# Patient Record
Sex: MALE | Race: ASIAN | NOT HISPANIC OR LATINO | URBAN - METROPOLITAN AREA
[De-identification: names, ages, dates, MRNs, and addresses within clinical notes are randomized per-mention and may not be internally consistent; named-entity substitution may affect disease eponyms.]

---

## 2020-07-27 ENCOUNTER — LAB OUTSIDE AN ENCOUNTER (OUTPATIENT)
Dept: URBAN - METROPOLITAN AREA CLINIC 105 | Facility: CLINIC | Age: 45
End: 2020-07-27

## 2020-07-29 LAB
A/G RATIO: 2
ALBUMIN: 4.9
ALKALINE PHOSPHATASE: 68
ALT (SGPT): 29
AST (SGOT): 21
BASO (ABSOLUTE): 0
BASOS: 0
BILIRUBIN, TOTAL: 0.5
BUN/CREATININE RATIO: 10
BUN: 9
CALCIUM: 9.8
CARBON DIOXIDE, TOTAL: 27
CHLORIDE: 99
CREATININE: 0.9
EGFR IF AFRICN AM: 120
EGFR IF NONAFRICN AM: 104
EOS (ABSOLUTE): 0.1
EOS: 2
GLOBULIN, TOTAL: 2.5
GLUCOSE: 97
HEMATOCRIT: 46
HEMATOLOGY COMMENTS:: (no result)
HEMOGLOBIN: 15.7
IMMATURE CELLS: (no result)
IMMATURE GRANS (ABS): 0
IMMATURE GRANULOCYTES: 0
IMMUNOGLOBULIN A, QN, SERUM: 178
LYMPHS (ABSOLUTE): 1.9
LYMPHS: 23
MCH: 31.4
MCHC: 34.1
MCV: 92
MONOCYTES(ABSOLUTE): 0.6
MONOCYTES: 8
NEUTROPHILS (ABSOLUTE): 5.3
NEUTROPHILS: 67
NRBC: (no result)
PLATELETS: 240
POTASSIUM: 4
PROTEIN, TOTAL: 7.4
RBC: 5
RDW: 12.6
SODIUM: 140
T-TRANSGLUTAMINASE (TTG) IGA: <2
T4,FREE(DIRECT): 1.71
TSH: 1.34
WBC: 8

## 2020-07-30 ENCOUNTER — LAB OUTSIDE AN ENCOUNTER (OUTPATIENT)
Dept: URBAN - METROPOLITAN AREA CLINIC 105 | Facility: CLINIC | Age: 45
End: 2020-07-30

## 2020-07-30 LAB — GASTROINTESTINAL PATHOGEN: (no result)

## 2020-07-31 ENCOUNTER — WEB ENCOUNTER (OUTPATIENT)
Dept: URBAN - METROPOLITAN AREA CLINIC 105 | Facility: CLINIC | Age: 45
End: 2020-07-31

## 2020-08-04 ENCOUNTER — TELEPHONE ENCOUNTER (OUTPATIENT)
Dept: URBAN - METROPOLITAN AREA CLINIC 105 | Facility: CLINIC | Age: 45
End: 2020-08-04

## 2020-08-05 LAB
Lab: (no result)
OVA + PARASITE EXAM: (no result)

## 2020-08-06 ENCOUNTER — TELEPHONE ENCOUNTER (OUTPATIENT)
Dept: URBAN - METROPOLITAN AREA CLINIC 105 | Facility: CLINIC | Age: 45
End: 2020-08-06

## 2020-08-06 ENCOUNTER — WEB ENCOUNTER (OUTPATIENT)
Dept: URBAN - METROPOLITAN AREA CLINIC 105 | Facility: CLINIC | Age: 45
End: 2020-08-06

## 2020-08-10 ENCOUNTER — WEB ENCOUNTER (OUTPATIENT)
Dept: URBAN - METROPOLITAN AREA CLINIC 105 | Facility: CLINIC | Age: 45
End: 2020-08-10

## 2020-08-13 ENCOUNTER — WEB ENCOUNTER (OUTPATIENT)
Dept: URBAN - METROPOLITAN AREA CLINIC 105 | Facility: CLINIC | Age: 45
End: 2020-08-13

## 2020-08-25 ENCOUNTER — WEB ENCOUNTER (OUTPATIENT)
Dept: URBAN - METROPOLITAN AREA CLINIC 105 | Facility: CLINIC | Age: 45
End: 2020-08-25

## 2020-08-27 ENCOUNTER — LAB OUTSIDE AN ENCOUNTER (OUTPATIENT)
Dept: URBAN - METROPOLITAN AREA CLINIC 105 | Facility: CLINIC | Age: 45
End: 2020-08-27

## 2020-08-27 ENCOUNTER — OFFICE VISIT (OUTPATIENT)
Dept: URBAN - METROPOLITAN AREA CLINIC 105 | Facility: CLINIC | Age: 45
End: 2020-08-27
Payer: COMMERCIAL

## 2020-08-27 DIAGNOSIS — N50.819 TESTICULAR PAIN: ICD-10-CM

## 2020-08-27 DIAGNOSIS — B37.0 ORAL THRUSH: ICD-10-CM

## 2020-08-27 DIAGNOSIS — R10.32 LEFT INGUINAL PAIN: ICD-10-CM

## 2020-08-27 DIAGNOSIS — R19.7 DIARRHEA: ICD-10-CM

## 2020-08-27 DIAGNOSIS — F41.8 ANXIETY ABOUT HEALTH: ICD-10-CM

## 2020-08-27 PROCEDURE — G9903 PT SCRN TBCO ID AS NON USER: HCPCS | Performed by: INTERNAL MEDICINE

## 2020-08-27 PROCEDURE — 99214 OFFICE O/P EST MOD 30 MIN: CPT | Performed by: INTERNAL MEDICINE

## 2020-08-27 PROCEDURE — G8420 CALC BMI NORM PARAMETERS: HCPCS | Performed by: INTERNAL MEDICINE

## 2020-08-27 PROCEDURE — G8427 DOCREV CUR MEDS BY ELIG CLIN: HCPCS | Performed by: INTERNAL MEDICINE

## 2020-08-27 RX ORDER — ALBUTEROL SULFATE 90 UG/1
INHALE 1 PUFF (90 MCG) BY INHALATION ROUTE EVERY 6 HOURS AS NEEDED AEROSOL, METERED RESPIRATORY (INHALATION)
Qty: 1 | Refills: 0 | Status: ACTIVE | COMMUNITY
Start: 1900-01-01

## 2020-08-27 RX ORDER — LEVOTHYROXINE SODIUM 25 UG/1
TAKE 1 TABLET (25 MCG) BY ORAL ROUTE ONCE DAILY TABLET ORAL 1
Qty: 0 | Refills: 0 | Status: ACTIVE | COMMUNITY
Start: 1900-01-01

## 2020-08-27 RX ORDER — VALACYCLOVIR HCL 500 MG
TAKE 1 TABLET BY ORAL ROUTE AS NEEDED TABLET ORAL
Qty: 0 | Refills: 0 | Status: DISCONTINUED | COMMUNITY
Start: 1900-01-01

## 2020-08-27 NOTE — HPI-OTHER HISTORIES
The patient presents in follow-up for diarrhea.  He was initially see on 12/17/20 for intermittent LLQ pain that started 3 months ago. It's a sharp pain that occurs 1-3x/QOD and lasts for a few min. The pain worsens when he sits, sleeps on his left side, and does yoga. He notes improvement with palpation. He also reports testicular pain that began at the same time. He can report pain in both areas simultaneously, but it's possible for him to have LLQ pain without testicular discomfort. This started around the time he was diagnosed with thrush that did not respond to Diflucan so prescribed itraconazole (Symbicort thought to be cause).   He occasionally feels a LLQ bulge. He takes Tylenol 1 pill PRN. He takes 1 in day. It relieved his discomfort. He last followed-up with Dr. Enriquez in Sep for his pain. He takes TUMs PRN for heartburn. He notes relief. He previously took Prevacid 6-7 years ago for about 2 years. He reports mucus in his stool.  On 5/28/20, he stated he was having episodic diarrhea since Sept. 2019.  He noted a recent exacerbation starting 2 weeks ago.  He described 1 BM/day which was still a "log of stool", but with some accompanying "froth."  He had bloating in the AM.  He stated he also had a penile lesion (noted previous herpes diagnosis) and he noted association with penile lesion and the change in his bowel habit. At this point he had very little LLQ pain which if he had was relieved with Tylenol. 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 He was referred to urology and tx'ed with abx for 2 weeks.    Today, he says he finished the 10 day course of Cipro given stool study 7/27/20 noted EPEC. He has diarrhea 2-3x/week with urgency (soft - noted in photograph patient provided - not watery diarrhea). He has formed BMs between episodes. He takes a probiotic QD. It provides more formation he states. He finished course of Cipro 10 days. He has been on fluconazole for 3 days/month for the past year for recurrent thrush. It was prescribed by his PCP (Dr. Enriquez) he states. He says onset of diarrhea came after starting fluconazole. He says chickpeas, watermelon, and apple cause an immediate BM. He notes fatigue, weakness, and the inability to concentrate at work. He denies anxiety. He admits worry when diagnosed with mouth thrush.  Last Symbicort use was 8 months before. He takes Levothyroxine. Hernia repair (left inguinal) done on 7/10/20.    ENT note 11/18/19 by Dr. Mancuso notes pt referred for mouth ulcers.  Previously oral thrush had resolved.  Biotene for dry mouth recommended.  PCP clinic note 6/30/20 by Dr. Enriquez notes history of viral/fungal skin infectious of genitalia.  He had trialed antifungal ointment/Valtrex with limited improvement.  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  He was prescribed famcyclovir and was to use an old Bactrim script.  That was unsuccessfuly and he was prescribed doxycycline or cipro.  Noted to have tonsil stones improved with peridex.  PCP clinic note 8/7/20 by Dr. Enriquez notes patient worried he has a GI/oral fungal infection because of recent Cipro use.  Dx;ed with IBS w/ diarrhea exacerbated by E coli infection.  Noted to have anxiety and SSRI recommended.  Patient noted to have anxiety related to medical health.  Labs 7/28/20 - Stool GPP - EPEC positive. 11/8/19 - CMP normal except glucose 108, ALT 56.  CBC normal.  TSH 2.86, FT4 1.4.  HIV non-reactive. 10/1/19 - CMP normal.  CBC normal.  HgbA1C 5.2.   8/20/19 - UA negative.

## 2020-08-29 LAB
A/G RATIO: 1.6
ALBUMIN: 4.6
ALKALINE PHOSPHATASE: 82
ALT (SGPT): 21
AST (SGOT): 19
BASO (ABSOLUTE): 0
BASOS: 0
BILIRUBIN, TOTAL: 0.8
BUN/CREATININE RATIO: 8
BUN: 7
CALCIUM: 10.1
CARBON DIOXIDE, TOTAL: 27
CHLORIDE: 97
CREATININE: 0.93
EGFR IF AFRICN AM: 115
EGFR IF NONAFRICN AM: 99
EOS (ABSOLUTE): 0.1
EOS: 1
GLOBULIN, TOTAL: 2.8
GLUCOSE: 121
HEMATOCRIT: 48.4
HEMATOLOGY COMMENTS:: (no result)
HEMOGLOBIN: 16.3
IMMATURE CELLS: (no result)
IMMATURE GRANS (ABS): 0
IMMATURE GRANULOCYTES: 0
IMMUNOGLOBULIN A, QN, SERUM: 178
LYMPHS (ABSOLUTE): 1.5
LYMPHS: 22
MCH: 30.6
MCHC: 33.7
MCV: 91
MONOCYTES(ABSOLUTE): 0.5
MONOCYTES: 6
NEUTROPHILS (ABSOLUTE): 5
NEUTROPHILS: 71
NRBC: (no result)
PLATELETS: 215
POTASSIUM: 4.1
PROTEIN, TOTAL: 7.4
RBC: 5.33
RDW: 13.3
SODIUM: 139
T-TRANSGLUTAMINASE (TTG) IGA: <2
T4,FREE(DIRECT): 1.82
TSH: 1.2
WBC: 7

## 2020-08-31 ENCOUNTER — WEB ENCOUNTER (OUTPATIENT)
Dept: URBAN - METROPOLITAN AREA CLINIC 105 | Facility: CLINIC | Age: 45
End: 2020-08-31

## 2020-09-02 LAB — GASTROINTESTINAL PATHOGEN: (no result)

## 2020-09-21 ENCOUNTER — OFFICE VISIT (OUTPATIENT)
Dept: URBAN - METROPOLITAN AREA SURGERY CENTER 16 | Facility: SURGERY CENTER | Age: 45
End: 2020-09-21

## 2020-11-23 ENCOUNTER — OFFICE VISIT (OUTPATIENT)
Dept: URBAN - METROPOLITAN AREA SURGERY CENTER 16 | Facility: SURGERY CENTER | Age: 45
End: 2020-11-23

## 2021-05-28 ENCOUNTER — OFFICE VISIT (OUTPATIENT)
Dept: URBAN - METROPOLITAN AREA SURGERY CENTER 16 | Facility: SURGERY CENTER | Age: 46
End: 2021-05-28

## 2021-06-27 ENCOUNTER — WEB ENCOUNTER (OUTPATIENT)
Dept: URBAN - METROPOLITAN AREA CLINIC 105 | Facility: CLINIC | Age: 46
End: 2021-06-27

## 2021-07-02 ENCOUNTER — OFFICE VISIT (OUTPATIENT)
Dept: URBAN - METROPOLITAN AREA SURGERY CENTER 16 | Facility: SURGERY CENTER | Age: 46
End: 2021-07-02

## 2021-07-02 ENCOUNTER — WEB ENCOUNTER (OUTPATIENT)
Dept: URBAN - METROPOLITAN AREA CLINIC 105 | Facility: CLINIC | Age: 46
End: 2021-07-02

## 2021-07-08 ENCOUNTER — WEB ENCOUNTER (OUTPATIENT)
Dept: URBAN - METROPOLITAN AREA CLINIC 105 | Facility: CLINIC | Age: 46
End: 2021-07-08

## 2021-07-10 ENCOUNTER — WEB ENCOUNTER (OUTPATIENT)
Dept: URBAN - METROPOLITAN AREA CLINIC 105 | Facility: CLINIC | Age: 46
End: 2021-07-10

## 2021-07-12 ENCOUNTER — WEB ENCOUNTER (OUTPATIENT)
Dept: URBAN - METROPOLITAN AREA CLINIC 105 | Facility: CLINIC | Age: 46
End: 2021-07-12

## 2021-07-16 ENCOUNTER — OFFICE VISIT (OUTPATIENT)
Dept: URBAN - METROPOLITAN AREA SURGERY CENTER 16 | Facility: SURGERY CENTER | Age: 46
End: 2021-07-16
Payer: COMMERCIAL

## 2021-07-16 DIAGNOSIS — D12.4 ADENOMA OF DESCENDING COLON: ICD-10-CM

## 2021-07-16 DIAGNOSIS — R19.7 ACUTE DIARRHEA: ICD-10-CM

## 2021-07-16 DIAGNOSIS — D12.2 ADENOMA OF ASCENDING COLON: ICD-10-CM

## 2021-07-16 PROCEDURE — G8907 PT DOC NO EVENTS ON DISCHARG: HCPCS | Performed by: INTERNAL MEDICINE

## 2021-07-16 PROCEDURE — 45385 COLONOSCOPY W/LESION REMOVAL: CPT | Performed by: INTERNAL MEDICINE

## 2021-07-27 ENCOUNTER — WEB ENCOUNTER (OUTPATIENT)
Dept: URBAN - METROPOLITAN AREA CLINIC 105 | Facility: CLINIC | Age: 46
End: 2021-07-27

## 2021-07-27 ENCOUNTER — OFFICE VISIT (OUTPATIENT)
Dept: URBAN - METROPOLITAN AREA CLINIC 105 | Facility: CLINIC | Age: 46
End: 2021-07-27
Payer: COMMERCIAL

## 2021-07-27 DIAGNOSIS — B37.0 ORAL THRUSH: ICD-10-CM

## 2021-07-27 DIAGNOSIS — N50.819 TESTICULAR PAIN: ICD-10-CM

## 2021-07-27 DIAGNOSIS — Z86.010 PERSONAL HISTORY OF COLONIC POLYPS: ICD-10-CM

## 2021-07-27 DIAGNOSIS — K58.0 IRRITABLE BOWEL SYNDROME WITH DIARRHEA: ICD-10-CM

## 2021-07-27 DIAGNOSIS — R79.89 ELEVATED SERUM FREE T4 LEVEL: ICD-10-CM

## 2021-07-27 DIAGNOSIS — F41.8 ANXIETY ABOUT HEALTH: ICD-10-CM

## 2021-07-27 DIAGNOSIS — R19.7 DIARRHEA: ICD-10-CM

## 2021-07-27 DIAGNOSIS — R10.32 LEFT INGUINAL PAIN: ICD-10-CM

## 2021-07-27 PROCEDURE — 99214 OFFICE O/P EST MOD 30 MIN: CPT | Performed by: INTERNAL MEDICINE

## 2021-07-27 RX ORDER — AMITRIPTYLINE HYDROCHLORIDE 10 MG/1
1 TABLET AT BEDTIME TABLET, FILM COATED ORAL ONCE A DAY
Qty: 30 | Refills: 1 | OUTPATIENT
Start: 2021-07-27

## 2021-07-27 RX ORDER — HYOSCYAMINE SULFATE 0.12 MG/1
1 TABLET UNDER THE TONGUE AND ALLOW TO DISSOLVE TABLET SUBLINGUAL
Qty: 30 | Refills: 1 | OUTPATIENT
Start: 2021-07-27 | End: 2021-09-25

## 2021-07-27 RX ORDER — LEVOTHYROXINE SODIUM 25 UG/1
TAKE 1 TABLET (25 MCG) BY ORAL ROUTE ONCE DAILY TABLET ORAL 1
Qty: 0 | Refills: 0 | Status: ACTIVE | COMMUNITY
Start: 1900-01-01

## 2021-07-27 RX ORDER — ALBUTEROL SULFATE 90 UG/1
INHALE 1 PUFF (90 MCG) BY INHALATION ROUTE EVERY 6 HOURS AS NEEDED AEROSOL, METERED RESPIRATORY (INHALATION)
Qty: 1 | Refills: 0 | Status: ACTIVE | COMMUNITY
Start: 1900-01-01

## 2021-07-27 NOTE — HPI-OTHER HISTORIES
The patient presents in follow-up for diarrhea.  He was initially see on 12/17/20 for intermittent LLQ pain that started 3 months ago. It's a sharp pain that occurs 1-3x/QOD and lasts for a few min. The pain worsens when he sits, sleeps on his left side, and does yoga. He notes improvement with palpation. He also reports testicular pain that began at the same time. He can report pain in both areas simultaneously, but it's possible for him to have LLQ pain without testicular discomfort. This started around the time he was diagnosed with thrush that did not respond to Diflucan so prescribed itraconazole (Symbicort thought to be cause).   He occasionally feels a LLQ bulge. He takes Tylenol 1 pill PRN. He takes 1 in day. It relieved his discomfort. He last followed-up with Dr. Enriquez in Sep for his pain. He takes TUMs PRN for heartburn. He notes relief. He previously took Prevacid 6-7 years ago for about 2 years. He reports mucus in his stool.  On 5/28/20, he stated he was having episodic diarrhea since Sept. 2019.  He noted a recent exacerbation starting 2 weeks ago.  He described 1 BM/day which was still a "log of stool", but with some accompanying "froth."  He had bloating in the AM.  He stated he also had a penile lesion (noted previous herpes diagnosis) and he noted association with penile lesion and the change in his bowel habit. At this point he had very little LLQ pain which if he had was relieved with Tylenol. 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 He was referred to urology and tx'ed with abx for 2 weeks.    On 8/27/20, he said he finished the 10 day course of Cipro given stool study 7/27/20 noted EPEC. He had diarrhea 2-3x/week with urgency (soft - noted in photograph patient provided - not watery diarrhea). He had formed BMs between episodes. He took a probiotic QD. It provided more formation he stated. He finished course of Cipro 10 days. He had been on fluconazole for 3 days/month for the past year for recurrent thrush. It was prescribed by his PCP (Dr. Enriquez) he stated. He said onset of diarrhea came after starting fluconazole. He said chickpeas, watermelon, and apple caused an immediate BM. He noted fatigue, weakness, and the inability to concentrate at work. He denied anxiety. He admitted worry when diagnosed with mouth thrush.  Last Symbicort use was 8 months before. He took Levothyroxine. Hernia repair (left inguinal) done on 7/10/20.    ENT note 11/18/19 by Dr. Mancuso noted pt referred for mouth ulcers.  Previously oral thrush had resolved.  Biotene for dry mouth recommended.  PCP clinic note 6/30/20 by Dr. Enriquez noted history of viral/fungal skin infectious of genitalia.  He had trialed antifungal ointment/Valtrex with limited improvement.  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  He was prescribed famcyclovir and was to use an old Bactrim script.  That was unsuccessfuly and he was prescribed doxycycline or cipro.  Noted to have tonsil stones improved with peridex.  PCP clinic note 8/7/20 by Dr. Enriquez noted patient worried he has a GI/oral fungal infection because of recent Cipro use.  Dx;ed with IBS w/ diarrhea exacerbated by E coli infection.  Noted to have anxiety and SSRI recommended.  Patient noted to have anxiety related to medical health.  Today, he reports intermittent looser BMs since his last visit. His first BM in the day is formed, then the second BM can be looser. His bowel habit is not an issue during the evenings usually. He notes some abdominal spasms. He also has heart palpitations that can keep him up at night. He has been prescribed a medication for anxiety, but did not take it because he is meditating. He notes some occasional reflux previously.  He has a history of intermittent constipation relieved with Metamucil previously and "spasms" of the colon.  Labs 8/28/20 - CBC, CMP all normal. Celiac, stool study all negative. 7/28/20 - Stool GPP - EPEC positive. 11/8/19 - CMP normal except glucose 108, ALT 56.  CBC normal.  TSH 2.86, FT4 1.4.  HIV non-reactive. 10/1/19 - CMP normal.  CBC normal.  HgbA1C 5.2.   8/20/19 - UA negative.

## 2021-07-28 ENCOUNTER — WEB ENCOUNTER (OUTPATIENT)
Dept: URBAN - METROPOLITAN AREA CLINIC 105 | Facility: CLINIC | Age: 46
End: 2021-07-28

## 2021-08-06 ENCOUNTER — OFFICE VISIT (OUTPATIENT)
Dept: URBAN - METROPOLITAN AREA TELEHEALTH 2 | Facility: TELEHEALTH | Age: 46
End: 2021-08-06

## 2021-08-18 ENCOUNTER — WEB ENCOUNTER (OUTPATIENT)
Dept: URBAN - METROPOLITAN AREA CLINIC 105 | Facility: CLINIC | Age: 46
End: 2021-08-18

## 2021-09-10 ENCOUNTER — DASHBOARD ENCOUNTERS (OUTPATIENT)
Age: 46
End: 2021-09-10

## 2021-09-10 ENCOUNTER — OFFICE VISIT (OUTPATIENT)
Dept: URBAN - METROPOLITAN AREA TELEHEALTH 2 | Facility: TELEHEALTH | Age: 46
End: 2021-09-10
Payer: COMMERCIAL

## 2021-09-10 DIAGNOSIS — B37.0 ORAL THRUSH: ICD-10-CM

## 2021-09-10 DIAGNOSIS — K58.0 IRRITABLE BOWEL SYNDROME WITH DIARRHEA: ICD-10-CM

## 2021-09-10 DIAGNOSIS — R79.89 ELEVATED SERUM FREE T4 LEVEL: ICD-10-CM

## 2021-09-10 DIAGNOSIS — R10.32 LEFT INGUINAL PAIN: ICD-10-CM

## 2021-09-10 PROCEDURE — 99213 OFFICE O/P EST LOW 20 MIN: CPT | Performed by: INTERNAL MEDICINE

## 2021-09-10 RX ORDER — ALBUTEROL SULFATE 90 UG/1
INHALE 1 PUFF (90 MCG) BY INHALATION ROUTE EVERY 6 HOURS AS NEEDED AEROSOL, METERED RESPIRATORY (INHALATION)
Qty: 1 | Refills: 0 | Status: ACTIVE | COMMUNITY
Start: 1900-01-01

## 2021-09-10 RX ORDER — AMITRIPTYLINE HYDROCHLORIDE 10 MG/1
1 TABLET AT BEDTIME TABLET, FILM COATED ORAL ONCE A DAY
Qty: 90 | Refills: 1
Start: 2021-07-27

## 2021-09-10 RX ORDER — HYOSCYAMINE SULFATE 0.12 MG/1
1 TABLET UNDER THE TONGUE AND ALLOW TO DISSOLVE TABLET SUBLINGUAL
Qty: 30 | Refills: 1 | Status: ACTIVE | COMMUNITY
Start: 2021-07-27 | End: 2021-09-25

## 2021-09-10 RX ORDER — LEVOTHYROXINE SODIUM 25 UG/1
TAKE 1 TABLET (25 MCG) BY ORAL ROUTE ONCE DAILY TABLET ORAL 1
Qty: 0 | Refills: 0 | Status: ACTIVE | COMMUNITY
Start: 1900-01-01

## 2021-09-10 RX ORDER — AMITRIPTYLINE HYDROCHLORIDE 10 MG/1
1 TABLET AT BEDTIME TABLET, FILM COATED ORAL ONCE A DAY
Qty: 30 | Refills: 1 | Status: ACTIVE | COMMUNITY
Start: 2021-07-27

## 2021-09-10 NOTE — HPI-OTHER HISTORIES
The patient presents in follow-up for diarrhea.  He was initially see on 12/17/20 for intermittent LLQ pain that started 3 months ago. It's a sharp pain that occurs 1-3x/QOD and lasts for a few min. The pain worsens when he sits, sleeps on his left side, and does yoga. He notes improvement with palpation. He also reports testicular pain that began at the same time. He can report pain in both areas simultaneously, but it's possible for him to have LLQ pain without testicular discomfort. This started around the time he was diagnosed with thrush that did not respond to Diflucan so prescribed itraconazole (Symbicort thought to be cause).   He occasionally feels a LLQ bulge. He takes Tylenol 1 pill PRN. He takes 1 in day. It relieved his discomfort. He last followed-up with Dr. Enriquez in Sep for his pain. He takes TUMs PRN for heartburn. He notes relief. He previously took Prevacid 6-7 years ago for about 2 years. He reports mucus in his stool.  On 5/28/20, he stated he was having episodic diarrhea since Sept. 2019.  He noted a recent exacerbation starting 2 weeks ago.  He described 1 BM/day which was still a "log of stool", but with some accompanying "froth."  He had bloating in the AM.  He stated he also had a penile lesion (noted previous herpes diagnosis) and he noted association with penile lesion and the change in his bowel habit. At this point he had very little LLQ pain which if he had was relieved with Tylenol. 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 He was referred to urology and tx'ed with abx for 2 weeks.    On 8/27/20, he said he finished the 10 day course of Cipro given stool study 7/27/20 noted EPEC. He had diarrhea 2-3x/week with urgency (soft - noted in photograph patient provided - not watery diarrhea). He had formed BMs between episodes. He took a probiotic QD. It provided more formation he stated. He finished course of Cipro 10 days. He had been on fluconazole for 3 days/month for the past year for recurrent thrush. It was prescribed by his PCP (Dr. Enriquez) he stated. He said onset of diarrhea came after starting fluconazole. He said chickpeas, watermelon, and apple caused an immediate BM. He noted fatigue, weakness, and the inability to concentrate at work. He denied anxiety. He admitted worry when diagnosed with mouth thrush.  Last Symbicort use was 8 months before. He took Levothyroxine. Hernia repair (left inguinal) done on 7/10/20.    ENT note 11/18/19 by Dr. Mancuso noted pt referred for mouth ulcers.  Previously oral thrush had resolved.  Biotene for dry mouth recommended.  PCP clinic note 6/30/20 by Dr. Enriquez noted history of viral/fungal skin infectious of genitalia.  He had trialed antifungal ointment/Valtrex with limited improvement.  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  He was prescribed famcyclovir and was to use an old Bactrim script.  That was unsuccessfuly and he was prescribed doxycycline or cipro.  Noted to have tonsil stones improved with peridex.  PCP clinic note 8/7/20 by Dr. Enriquez noted patient worried he has a GI/oral fungal infection because of recent Cipro use.  Dx;ed with IBS w/ diarrhea exacerbated by E coli infection.  Noted to have anxiety and SSRI recommended.  Patient noted to have anxiety related to medical health.  On 7/27/21, he reported intermittent looser BMs since his last visit. His first BM in the day was formed, then the second BM could be looser. His bowel habit was not an issue during the evenings usually. He noted some abdominal spasms. He also had heart palpitations that could keep him up at night. He had been prescribed a medication for anxiety, but did not take it because he was meditating. He noted some occasional reflux previously.  He had a history of intermittent constipation relieved with Metamucil previously and "spasms" of the colon.  Today, he says he started on amitriptyline 10 mg 1 pill QHS, but decreased to 0.5 pill due to experiencing drowsiness. He d/c hyoscyamine after taking 0.125 mg QD for 2 days due to difficulty urinating that he thinks is associated with use. Diarrhea has resolved. He has moved to [place].   Labs 8/28/20 - CBC, CMP all normal. Celiac, stool study all negative. 7/28/20 - Stool GPP - EPEC positive. 11/8/19 - CMP normal except glucose 108, ALT 56.  CBC normal.  TSH 2.86, FT4 1.4.  HIV non-reactive. 10/1/19 - CMP normal.  CBC normal.  HgbA1C 5.2.   8/20/19 - UA negative.

## 2021-09-12 PROBLEM — 197125005: Status: ACTIVE | Noted: 2021-07-27

## 2021-09-12 PROBLEM — 428283002: Status: ACTIVE | Noted: 2021-08-02

## 2021-09-12 PROBLEM — 247808006 ANXIETY ABOUT HEALTH: Status: ACTIVE | Noted: 2021-07-27

## 2022-01-14 ENCOUNTER — WEB ENCOUNTER (OUTPATIENT)
Dept: URBAN - METROPOLITAN AREA CLINIC 105 | Facility: CLINIC | Age: 47
End: 2022-01-14

## 2022-01-14 RX ORDER — AMITRIPTYLINE HYDROCHLORIDE 10 MG/1
1 TABLET AT BEDTIME TABLET, FILM COATED ORAL ONCE A DAY
Qty: 90 | Refills: 2
Start: 2021-07-27

## 2023-08-29 NOTE — PHYSICAL EXAM NECK/THYROID:
normal appearance
08/04/2023    MCHC 33.0 08/04/2023    RDW 13.9 08/04/2023    NEUTOPHILPCT 59 08/04/2023    MONOPCT 11 (H) 08/04/2023    BASOPCT 1 08/04/2023    NEUTROABS 3.10 08/04/2023    LYMPHSABS 1.40 08/04/2023    MONOSABS 0.60 08/04/2023    EOSABS 0.20 08/04/2023    BASOSABS 0.10 08/04/2023         Chemistry        Component Value Date/Time     08/12/2023 0928    K 3.8 08/12/2023 0928     08/12/2023 0928    CO2 25 08/12/2023 0928    BUN 13 08/12/2023 0928    CREATININE 1.1 (H) 08/12/2023 0928        Component Value Date/Time    CALCIUM 9.1 08/12/2023 0928    ALKPHOS 106 (H) 08/12/2023 0928    AST 19 08/12/2023 0928    ALT 8 08/12/2023 0928    BILITOT 0.5 08/12/2023 0928         Latest Reference Range & Units Most Recent   Kappa Free Light Chains QNT 0.37 - 1.94 mg/dL 5.57 (H)  3/21/23 14:51   Lambda Free Light Chains QNT 0.57 - 2.63 mg/dL 11.98 (H)  3/21/23 14:51   Free Kappa/Lambda Ratio 0.26 - 1.65  0.46  3/21/23 14:51   (H): Data is abnormally high  Serum IFX Interp  Immunofixation serum profile  Collected: 03/21/23 1451   Result status: Final   Resulting lab: [facility]   Value: A ZONE OF RESTRICTION IS PRESENT IN THE GAMMAGLOBULIN REGION. CONFIRMED BY IMMUNOFIXATION TO BE MONOCLONAL   Comment: IgG,   Lambda. THE APPROXIMATE DENSITOMETRIC QUANTITATION OF PARAPROTEIN IS   0.08 G/DL. PATHOLOGY DATA:   Reviewed  Procedures/Addenda   FLOW CYTOMETRY REPORT     Date Ordered:     3/22/2023     Status:   Signed Out        Date Complete:     3/22/2023     By:  Tanika Rico M.D. Date Reported:     3/22/2023       INTERPRETATION     Peripheral blood:     -Mild normocytic anemia (hemoglobin 11.3 g/DL), with reticulocytosis. Reticulocytosis suggests response to peripheral consumption such as   blood loss and/or hemolysis. Alternatively, this may represent   therapeutic response to treatment for hypoproliferative anemia.    Clinical correlation is necessary.     - Flow cytometric